# Patient Record
Sex: FEMALE | Race: WHITE | Employment: FULL TIME | URBAN - NONMETROPOLITAN AREA
[De-identification: names, ages, dates, MRNs, and addresses within clinical notes are randomized per-mention and may not be internally consistent; named-entity substitution may affect disease eponyms.]

---

## 2019-02-02 ENCOUNTER — HOSPITAL ENCOUNTER (EMERGENCY)
Age: 19
Discharge: HOME | End: 2019-02-02
Payer: COMMERCIAL

## 2019-02-02 VITALS
WEIGHT: 200 LBS | DIASTOLIC BLOOD PRESSURE: 85 MMHG | SYSTOLIC BLOOD PRESSURE: 136 MMHG | BODY MASS INDEX: 33.32 KG/M2 | TEMPERATURE: 98.8 F | HEART RATE: 93 BPM | HEIGHT: 65 IN | RESPIRATION RATE: 16 BRPM | OXYGEN SATURATION: 98 %

## 2019-02-02 DIAGNOSIS — J01.90 ACUTE SINUSITIS, RECURRENCE NOT SPECIFIED, UNSPECIFIED LOCATION: Primary | ICD-10-CM

## 2019-02-02 PROCEDURE — 99203 OFFICE O/P NEW LOW 30 MIN: CPT | Performed by: NURSE PRACTITIONER

## 2019-02-02 PROCEDURE — 99202 OFFICE O/P NEW SF 15 MIN: CPT

## 2019-02-02 RX ORDER — AMOXICILLIN AND CLAVULANATE POTASSIUM 875; 125 MG/1; MG/1
1 TABLET, FILM COATED ORAL 2 TIMES DAILY WITH MEALS
Qty: 20 TABLET | Refills: 0 | Status: SHIPPED | OUTPATIENT
Start: 2019-02-02 | End: 2019-02-12

## 2019-02-02 ASSESSMENT — ENCOUNTER SYMPTOMS
ABDOMINAL PAIN: 0
BACK PAIN: 0
SINUS PRESSURE: 1
DIARRHEA: 0
CONSTIPATION: 0
SHORTNESS OF BREATH: 0
SORE THROAT: 1
VOMITING: 0
PHOTOPHOBIA: 0
NAUSEA: 0
APNEA: 0
COUGH: 0
RHINORRHEA: 0
SINUS PAIN: 1

## 2019-02-02 ASSESSMENT — PAIN - FUNCTIONAL ASSESSMENT: PAIN_FUNCTIONAL_ASSESSMENT: ACTIVITIES ARE NOT PREVENTED

## 2019-02-02 ASSESSMENT — PAIN DESCRIPTION - ONSET: ONSET: AWAKENED FROM SLEEP

## 2019-02-02 ASSESSMENT — PAIN DESCRIPTION - PROGRESSION: CLINICAL_PROGRESSION: NOT CHANGED

## 2019-02-02 ASSESSMENT — PAIN SCALES - GENERAL: PAINLEVEL_OUTOF10: 8

## 2019-02-02 ASSESSMENT — PAIN DESCRIPTION - DESCRIPTORS: DESCRIPTORS: ACHING

## 2019-02-02 ASSESSMENT — PAIN DESCRIPTION - FREQUENCY: FREQUENCY: CONTINUOUS

## 2019-02-02 ASSESSMENT — PAIN DESCRIPTION - LOCATION: LOCATION: CHEST;HEAD

## 2019-02-02 ASSESSMENT — PAIN DESCRIPTION - PAIN TYPE: TYPE: ACUTE PAIN

## 2019-10-24 ENCOUNTER — OFFICE VISIT (OUTPATIENT)
Dept: FAMILY MEDICINE CLINIC | Age: 19
End: 2019-10-24
Payer: COMMERCIAL

## 2019-10-24 VITALS
DIASTOLIC BLOOD PRESSURE: 88 MMHG | RESPIRATION RATE: 18 BRPM | WEIGHT: 247 LBS | BODY MASS INDEX: 41.15 KG/M2 | HEIGHT: 65 IN | HEART RATE: 88 BPM | TEMPERATURE: 98.4 F | SYSTOLIC BLOOD PRESSURE: 120 MMHG | OXYGEN SATURATION: 98 %

## 2019-10-24 DIAGNOSIS — H02.843 SWELLING OF RIGHT EYELID: Primary | ICD-10-CM

## 2019-10-24 DIAGNOSIS — H02.846 SWELLING OF EYELID, LEFT: ICD-10-CM

## 2019-10-24 DIAGNOSIS — T78.40XA ALLERGIC REACTION, INITIAL ENCOUNTER: ICD-10-CM

## 2019-10-24 PROCEDURE — 96372 THER/PROPH/DIAG INJ SC/IM: CPT | Performed by: NURSE PRACTITIONER

## 2019-10-24 PROCEDURE — 99203 OFFICE O/P NEW LOW 30 MIN: CPT | Performed by: NURSE PRACTITIONER

## 2019-10-24 RX ORDER — METHYLPREDNISOLONE ACETATE 80 MG/ML
80 INJECTION, SUSPENSION INTRA-ARTICULAR; INTRALESIONAL; INTRAMUSCULAR; SOFT TISSUE ONCE
Status: COMPLETED | OUTPATIENT
Start: 2019-10-24 | End: 2019-10-24

## 2019-10-24 RX ADMIN — METHYLPREDNISOLONE ACETATE 80 MG: 80 INJECTION, SUSPENSION INTRA-ARTICULAR; INTRALESIONAL; INTRAMUSCULAR; SOFT TISSUE at 15:23

## 2019-10-24 ASSESSMENT — ENCOUNTER SYMPTOMS
EYE PAIN: 1
RHINORRHEA: 0
COUGH: 0
VOMITING: 0
SHORTNESS OF BREATH: 0
ABDOMINAL PAIN: 0
EYE REDNESS: 1
CHEST TIGHTNESS: 0
CONSTIPATION: 0
DIARRHEA: 0
EYE DISCHARGE: 0
EYE ITCHING: 1

## 2019-10-24 ASSESSMENT — PATIENT HEALTH QUESTIONNAIRE - PHQ9: DEPRESSION UNABLE TO ASSESS: FUNCTIONAL CAPACITY MOTIVATION LIMITS ACCURACY

## 2019-12-14 ENCOUNTER — HOSPITAL ENCOUNTER (EMERGENCY)
Age: 19
Discharge: HOME | End: 2019-12-14
Payer: COMMERCIAL

## 2019-12-14 VITALS
TEMPERATURE: 97.8 F | SYSTOLIC BLOOD PRESSURE: 133 MMHG | BODY MASS INDEX: 41.6 KG/M2 | RESPIRATION RATE: 18 BRPM | WEIGHT: 250 LBS | HEART RATE: 80 BPM | OXYGEN SATURATION: 98 % | DIASTOLIC BLOOD PRESSURE: 81 MMHG

## 2019-12-14 DIAGNOSIS — J02.9 ACUTE PHARYNGITIS, UNSPECIFIED ETIOLOGY: Primary | ICD-10-CM

## 2019-12-14 LAB
GROUP A STREP CULTURE, REFLEX: NEGATIVE
REFLEX THROAT C + S: NORMAL

## 2019-12-14 PROCEDURE — 99214 OFFICE O/P EST MOD 30 MIN: CPT

## 2019-12-14 PROCEDURE — 87880 STREP A ASSAY W/OPTIC: CPT

## 2019-12-14 PROCEDURE — 87070 CULTURE OTHR SPECIMN AEROBIC: CPT

## 2019-12-14 PROCEDURE — 99213 OFFICE O/P EST LOW 20 MIN: CPT | Performed by: NURSE PRACTITIONER

## 2019-12-14 RX ORDER — BROMPHENIRAMINE MALEATE, PSEUDOEPHEDRINE HYDROCHLORIDE, AND DEXTROMETHORPHAN HYDROBROMIDE 2; 30; 10 MG/5ML; MG/5ML; MG/5ML
5 SYRUP ORAL 3 TIMES DAILY PRN
Qty: 40 ML | Refills: 0 | Status: SHIPPED | OUTPATIENT
Start: 2019-12-14 | End: 2020-07-31

## 2019-12-14 RX ORDER — ACETAMINOPHEN 500 MG
500 TABLET ORAL EVERY 6 HOURS PRN
COMMUNITY

## 2019-12-14 RX ORDER — IBUPROFEN 400 MG/1
400 TABLET ORAL EVERY 6 HOURS PRN
Qty: 30 TABLET | Refills: 0 | Status: SHIPPED | OUTPATIENT
Start: 2019-12-14 | End: 2022-06-13 | Stop reason: SDUPTHER

## 2019-12-14 RX ORDER — AZELASTINE 1 MG/ML
1 SPRAY, METERED NASAL 2 TIMES DAILY
Qty: 1 BOTTLE | Refills: 3 | Status: SHIPPED | OUTPATIENT
Start: 2019-12-14 | End: 2020-07-31

## 2019-12-14 ASSESSMENT — ENCOUNTER SYMPTOMS
VOMITING: 0
ABDOMINAL PAIN: 0
RHINORRHEA: 1
CHEST TIGHTNESS: 0
SINUS PAIN: 0
SHORTNESS OF BREATH: 0
SWOLLEN GLANDS: 0
APNEA: 0
BLOOD IN STOOL: 0
STRIDOR: 0
NAUSEA: 1
COUGH: 1
WHEEZING: 0
DIARRHEA: 0
CONSTIPATION: 0
ABDOMINAL DISTENTION: 0
CHOKING: 0
ANAL BLEEDING: 0
SORE THROAT: 1

## 2019-12-14 ASSESSMENT — PAIN DESCRIPTION - ONSET: ONSET: GRADUAL

## 2019-12-14 ASSESSMENT — PAIN DESCRIPTION - PAIN TYPE: TYPE: ACUTE PAIN

## 2019-12-14 ASSESSMENT — PAIN DESCRIPTION - PROGRESSION: CLINICAL_PROGRESSION: GRADUALLY WORSENING

## 2019-12-14 ASSESSMENT — PAIN DESCRIPTION - DESCRIPTORS: DESCRIPTORS: ACHING

## 2019-12-14 ASSESSMENT — PAIN - FUNCTIONAL ASSESSMENT: PAIN_FUNCTIONAL_ASSESSMENT: ACTIVITIES ARE NOT PREVENTED

## 2019-12-14 ASSESSMENT — PAIN DESCRIPTION - FREQUENCY: FREQUENCY: CONTINUOUS

## 2019-12-14 ASSESSMENT — PAIN DESCRIPTION - LOCATION: LOCATION: THROAT

## 2019-12-16 LAB — THROAT/NOSE CULTURE: NORMAL

## 2020-02-11 ENCOUNTER — HOSPITAL ENCOUNTER (OUTPATIENT)
Age: 20
Discharge: HOME | End: 2020-02-11

## 2020-02-13 LAB — RUBELLA: 59.7 IU/ML

## 2020-02-14 LAB
MUMPS IGG TITER: 1.76
VZV IGG SER QL IA: 1.73

## 2020-02-15 LAB — RUBEOLA IGG: 20.5 AU/ML

## 2020-05-12 ENCOUNTER — HOSPITAL ENCOUNTER (EMERGENCY)
Age: 20
Discharge: HOME | End: 2020-05-12
Payer: COMMERCIAL

## 2020-05-12 VITALS
SYSTOLIC BLOOD PRESSURE: 133 MMHG | HEART RATE: 72 BPM | RESPIRATION RATE: 18 BRPM | OXYGEN SATURATION: 100 % | TEMPERATURE: 98.1 F | DIASTOLIC BLOOD PRESSURE: 92 MMHG

## 2020-05-12 LAB
HBV SURFACE AB TITR SER: NEGATIVE {TITER}
HEPATITIS C ANTIBODY: NEGATIVE

## 2020-05-12 PROCEDURE — 99282 EMERGENCY DEPT VISIT SF MDM: CPT

## 2020-05-12 PROCEDURE — 36415 COLL VENOUS BLD VENIPUNCTURE: CPT

## 2020-05-12 PROCEDURE — 86706 HEP B SURFACE ANTIBODY: CPT

## 2020-05-12 PROCEDURE — 87389 HIV-1 AG W/HIV-1&-2 AB AG IA: CPT

## 2020-05-12 PROCEDURE — 86803 HEPATITIS C AB TEST: CPT

## 2020-05-12 ASSESSMENT — ENCOUNTER SYMPTOMS
ABDOMINAL PAIN: 0
DIARRHEA: 0
SORE THROAT: 0
NAUSEA: 0
COUGH: 0
RHINORRHEA: 0
VOMITING: 0
CONSTIPATION: 0
BLOOD IN STOOL: 0
BACK PAIN: 0
SHORTNESS OF BREATH: 0

## 2020-05-13 LAB — HIV-2 AB: NEGATIVE

## 2020-07-31 ENCOUNTER — HOSPITAL ENCOUNTER (EMERGENCY)
Age: 20
Discharge: HOME | End: 2020-07-31
Payer: COMMERCIAL

## 2020-07-31 VITALS
RESPIRATION RATE: 16 BRPM | OXYGEN SATURATION: 98 % | TEMPERATURE: 98.5 F | SYSTOLIC BLOOD PRESSURE: 119 MMHG | DIASTOLIC BLOOD PRESSURE: 78 MMHG | HEART RATE: 110 BPM

## 2020-07-31 PROCEDURE — U0003 INFECTIOUS AGENT DETECTION BY NUCLEIC ACID (DNA OR RNA); SEVERE ACUTE RESPIRATORY SYNDROME CORONAVIRUS 2 (SARS-COV-2) (CORONAVIRUS DISEASE [COVID-19]), AMPLIFIED PROBE TECHNIQUE, MAKING USE OF HIGH THROUGHPUT TECHNOLOGIES AS DESCRIBED BY CMS-2020-01-R: HCPCS

## 2020-07-31 PROCEDURE — 99214 OFFICE O/P EST MOD 30 MIN: CPT

## 2020-07-31 PROCEDURE — 99213 OFFICE O/P EST LOW 20 MIN: CPT | Performed by: NURSE PRACTITIONER

## 2020-07-31 NOTE — ED PROVIDER NOTES
(TYLENOL) 500 MG tablet Take 500 mg by mouth every 6 hours as needed for PainHistorical Med      ibuprofen (IBU) 400 MG tablet Take 1 tablet by mouth every 6 hours as needed for Pain, Disp-30 tablet, R-0Print             ALLERGIES     Patient is has No Known Allergies. Patients   There is no immunization history on file for this patient. FAMILY HISTORY     Patient's family history includes Alzheimer's Disease in her maternal grandmother; Arthritis in her mother; Cancer in her maternal uncle and paternal grandmother; Heart Disease in her maternal grandmother; Other in her mother. SOCIAL HISTORY     Patient  reports that she has never smoked. She has never used smokeless tobacco. She reports that she does not drink alcohol or use drugs. PHYSICAL EXAM     ED TRIAGE VITALS  BP: 119/78, Temp: 98.5 °F (36.9 °C), Pulse: 110, Resp: 16, SpO2: 98 %,Estimated body mass index is 41.6 kg/m² as calculated from the following:    Height as of 10/24/19: 5' 5\" (1.651 m). Weight as of 12/14/19: 250 lb (113.4 kg). ,No LMP recorded. Physical Exam  Constitutional:       General: She is not in acute distress. Appearance: Normal appearance. She is not ill-appearing, toxic-appearing or diaphoretic. HENT:      Nose: Nose normal.   Cardiovascular:      Rate and Rhythm: Normal rate. Pulses: Normal pulses. Heart sounds: Normal heart sounds. No murmur. No friction rub. No gallop. Pulmonary:      Effort: Pulmonary effort is normal. No respiratory distress. Breath sounds: Normal breath sounds. No stridor. No wheezing, rhonchi or rales. Chest:      Chest wall: No tenderness. Musculoskeletal: Normal range of motion. Skin:     General: Skin is warm. Capillary Refill: Capillary refill takes less than 2 seconds. Findings: No rash. Neurological:      General: No focal deficit present. Mental Status: She is alert and oriented to person, place, and time. Sensory: No sensory deficit. Psychiatric:         Mood and Affect: Mood normal.         Behavior: Behavior normal.         Thought Content: Thought content normal.         Judgment: Judgment normal.         DIAGNOSTIC RESULTS     Labs:No results found for this visit on 07/31/20. IMAGING:    No orders to display     URGENT CARE COURSE:     Vitals:    07/31/20 1952   BP: 119/78   Pulse: 110   Resp: 16   Temp: 98.5 °F (36.9 °C)   TempSrc: Temporal   SpO2: 98%       Medications - No data to display         PROCEDURES:  None    FINAL IMPRESSION      1. Viral illness    2. Cough    3. Body aches          DISPOSITION/ PLAN   Patient is discharged home with instructions to self quarantine while COVID-19 results are pending. She is informed that this can take 4 to 5 days to be resulted. She should manage her symptoms at home such as adequate fluid hydration, use of Tylenol, and rest.  Patient is informed that if she cannot manage her symptoms at home she should go directly to the ER, such as in the event of shortness of breath, extreme body aches, blood in stool, or fevers that cannot be controlled with Tylenol. Patient is informed that if she does have a positive COVID test, she will need to follow-up with her primary care provider and/or health department for further evaluation. PATIENT REFERRED TO:  No primary care provider on file. No primary physician on file.       DISCHARGE MEDICATIONS:  Discharge Medication List as of 7/31/2020  7:54 PM          Discharge Medication List as of 7/31/2020  7:54 PM      STOP taking these medications       azelastine (ASTELIN) 0.1 % nasal spray Comments:   Reason for Stopping:         brompheniramine-pseudoephedrine-DM (BROMFED DM) 2-30-10 MG/5ML syrup Comments:   Reason for Stopping:         Magic Mouthwash (MIRACLE MOUTHWASH) Comments:   Reason for Stopping:               Discharge Medication List as of 7/31/2020  7:54 PM          DALTON Lieberman NP    (Please note that portions of this note were completed with a voice recognition program. Efforts were made to edit the dictations but occasionally words are mis-transcribed.)         DALTON Raphael NP  08/01/20 2183

## 2020-07-31 NOTE — ED TRIAGE NOTES
Ruddy Corbett arrives by self  to room with complaint of loose stool body aches fever fatigue symptoms started today.   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

## 2020-08-01 ENCOUNTER — CARE COORDINATION (OUTPATIENT)
Dept: CARE COORDINATION | Age: 20
End: 2020-08-01

## 2020-08-01 ASSESSMENT — ENCOUNTER SYMPTOMS
VOMITING: 0
STRIDOR: 0
BLOOD IN STOOL: 0
WHEEZING: 0
CHEST TIGHTNESS: 0
ABDOMINAL PAIN: 0
COUGH: 0
SHORTNESS OF BREATH: 0
NAUSEA: 0
SORE THROAT: 0
DIARRHEA: 1

## 2020-08-01 NOTE — CARE COORDINATION
Patient contacted regarding COVID-19 exposure. Discussed COVID-19 related testing which was pending at this time. Test results were pending. Patient informed of results, if available? Results pending    Care Transition Nurse/ Ambulatory Care Manager contacted the patient by telephone to perform post discharge assessment. Verified name and  with patient as identifiers. Provided introduction to self, and explanation of the CTN/ACM role, and reason for call due to risk factors for infection and/or exposure to COVID-19. Symptoms reviewed with patient who verbalized the following symptoms: fatigue, pain or aching joints, sweating, nausea, diarrhea, no new symptoms and no worsening symptoms. lack of taste and smell, headache      Due to no new or worsening symptoms encounter was not routed to provider for escalation. Discussed follow-up appointments. If no appointment was previously scheduled, appointment scheduling offered: Yes  [facility] follow up appointment(s): No future appointments. Non-[facility] follow up appointment(s): pt does not have PCP. Provided her with presevice     Advance Care Planning:   Does patient have an Advance Directive:  decision maker updated. Patient has following risk factors of: no known risk factors. CTN/ACM reviewed discharge instructions, medical action plan and red flags such as increased shortness of breath, increasing fever and signs of decompensation with patient who verbalized understanding. Discussed exposure protocols and quarantine with CDC Guidelines What to do if you are sick with coronavirus disease .  Patient was given an opportunity for questions and concerns. The patient agrees to contact the [facility] exposure line [phone], local [facility] department [place] Department of Health: ([phone]) and PCP office for questions related to their healthcare. CTN/ACM provided contact information for future needs.     Reviewed and educated patient on any new and changed medications related to discharge diagnosis     Patient/family/caregiver given information for GetWell Loop and agrees to enroll yes  Patient's preferred e-mail: [email or web address]   Patient's preferred phone number: [phone]  Based on Loop alert triggers, patient will be contacted by nurse care manager for worsening symptoms. Pt will be further monitored by COVID Loop Team based on severity of symptoms and risk factors. Pt reports diarrhea persists. Encouraged to use own bathroom facilities and to isolate from others in the household. Encouraged increased fluids and rest.  Aware covid test results pending. ACP reviewed. Enrolled in Loop.

## 2020-08-01 NOTE — ED NOTES
Covid pharyngeal swab obtained and sent to lab. Proper ppe worn during procedure.      Doyle Au LPN  02/51/88 7732

## 2020-08-03 ENCOUNTER — CARE COORDINATION (OUTPATIENT)
Dept: CARE COORDINATION | Age: 20
End: 2020-08-03

## 2020-08-03 LAB — SARS-COV-2: NOT DETECTED

## 2020-08-03 NOTE — CARE COORDINATION
RN placed call to patient to respond to Yellow alert due to GI symptoms in GetWell Loop. Patient reports her symptoms are the same as when she was seen. Loose stools are most concerning to patient. Patient is concerned about CDIFF if this is not COVID 19.   COVID 19 results pending. Patient has mychart. RN instructed patient to continue to monitor symptoms, report them on loop. Increase fluids and rest, take tylenol for body aches or fever unless contraindicated. PCP was not notified of alert due to no worsening concerns. Patient encouraged to call PCP if symptoms worsen. Precautions reviewed for COVID-19 per CDC  Wash hands often with soap and water for at least 20 seconds  When soap is not available us hand  with at least 70% alcohol  Avoid touching your face  Avoid close contact with others  Maintain 6 feet distance if possible    While you are sick:  Cover mouth and nose when coughing or sneezing and then wash hands  Wear a mask when around others  Clean and disinfect surfaces often with soap or detergent and water.

## 2020-08-06 ENCOUNTER — HOSPITAL ENCOUNTER (EMERGENCY)
Age: 20
Discharge: HOME | End: 2020-08-06
Payer: COMMERCIAL

## 2020-08-06 VITALS
RESPIRATION RATE: 16 BRPM | TEMPERATURE: 98.6 F | BODY MASS INDEX: 38.57 KG/M2 | HEART RATE: 100 BPM | HEIGHT: 66 IN | OXYGEN SATURATION: 97 % | SYSTOLIC BLOOD PRESSURE: 116 MMHG | WEIGHT: 240 LBS | DIASTOLIC BLOOD PRESSURE: 85 MMHG

## 2020-08-06 PROCEDURE — 99213 OFFICE O/P EST LOW 20 MIN: CPT

## 2020-08-06 PROCEDURE — 99213 OFFICE O/P EST LOW 20 MIN: CPT | Performed by: NURSE PRACTITIONER

## 2020-08-06 RX ORDER — GREEN TEA/HOODIA GORDONII 315-12.5MG
1 CAPSULE ORAL DAILY
Qty: 30 TABLET | Refills: 0 | Status: SHIPPED | OUTPATIENT
Start: 2020-08-06 | End: 2020-09-05

## 2020-08-06 ASSESSMENT — ENCOUNTER SYMPTOMS
COUGH: 0
SORE THROAT: 0
VOMITING: 0
SHORTNESS OF BREATH: 0
NAUSEA: 0
DIARRHEA: 1
ABDOMINAL PAIN: 1

## 2020-08-06 ASSESSMENT — PAIN DESCRIPTION - ORIENTATION: ORIENTATION: MID

## 2020-08-06 ASSESSMENT — PAIN DESCRIPTION - LOCATION: LOCATION: ABDOMEN

## 2020-08-06 ASSESSMENT — PAIN SCALES - GENERAL: PAINLEVEL_OUTOF10: 7

## 2020-08-06 NOTE — ED PROVIDER NOTES
[place] [address]  Urgent Care Encounter       CHIEF COMPLAINT       Chief Complaint   Patient presents with    Abdominal Pain     fatigue, diarrhea, continues tested neg for covid last friday    Diarrhea     5 to 6 times daily, watery, no fever, has felt hot       Nurses Notes reviewed and I agree except as noted in the HPI. HISTORY OF PRESENT ILLNESS   Donnia Cabot is a 20 y.o. female who presents for evaluation of abdominal cramping and persistent diarrhea that is been ongoing for the past 6 days without relief. Patient denies any fever, cough, congestion or sore throat. Patient denies any unusual foods or any new medications that she can recall before the symptoms began. Patient denies having taken any medications at home for the symptoms. The history is provided by the patient. REVIEW OF SYSTEMS     Review of Systems   Constitutional: Negative for chills and fever. HENT: Negative for congestion and sore throat. Respiratory: Negative for cough and shortness of breath. Cardiovascular: Negative for chest pain. Gastrointestinal: Positive for abdominal pain (cramping) and diarrhea. Negative for nausea and vomiting. Musculoskeletal: Negative for arthralgias and myalgias. Skin: Negative for rash. Allergic/Immunologic: Negative for immunocompromised state. Neurological: Negative for headaches. PAST MEDICAL HISTORY         Diagnosis Date    RSV (acute bronchiolitis due to respiratory syncytial virus)        SURGICALHISTORY     Patient  has no past surgical history on file.     CURRENT MEDICATIONS       Discharge Medication List as of 8/6/2020  4:22 PM      CONTINUE these medications which have NOT CHANGED    Details   acetaminophen (TYLENOL) 500 MG tablet Take 500 mg by mouth every 6 hours as needed for PainHistorical Med      ibuprofen (IBU) 400 MG tablet Take 1 tablet by mouth every 6 hours as needed for Pain, Disp-30 tablet, R-0Print Behavior: Behavior normal.         DIAGNOSTIC RESULTS     Labs:No results found for this visit on 08/06/20. IMAGING:    No orders to display         EKG:  none    URGENT CARE COURSE:     Vitals:    08/06/20 1600   BP: 116/85   Pulse: 100   Resp: 16   Temp: 98.6 °F (37 °C)   TempSrc: Infrared   SpO2: 97%   Weight: 240 lb (108.9 kg)   Height: 5' 5.5\" (1.664 m)       Medications - No data to display         PROCEDURES:  None    FINAL IMPRESSION      1. Diarrhea of presumed infectious origin          DISPOSITION/ PLAN     I discussed with the patient the plan to provide a gastrointestinal panel to evaluate the cause of her diarrhea. She is also given a prescription for probiotics and advised to remain hydrated at home. She is given information of a primary care provider that she may follow-up with if her symptoms do not resolve or if there is no findings from the gastrointestinal panel. She is agreeable to plan as discussed and will follow-up on an outpatient basis as needed. PATIENT REFERRED TO:  No primary care provider on file. No primary physician on file.       DISCHARGE MEDICATIONS:  Discharge Medication List as of 8/6/2020  4:22 PM      START taking these medications    Details   Probiotic Acidophilus (FLORANEX) TABS Take 1 tablet by mouth daily, Disp-30 tablet,R-0Normal             Discharge Medication List as of 8/6/2020  4:22 PM          Discharge Medication List as of 8/6/2020  4:22 PM          DALTON Us CNP    (Please note that portions of this note were completed with a voice recognition program. Efforts were made to edit the dictations but occasionally words are mis-transcribed.)          DALTON Us CNP  08/06/20 3562

## 2020-08-06 NOTE — ED NOTES
Pt. Released in stable condition, ambulated per self to private car. Instructed pt to follow-up with family doctor as needed for recheck or go directly to the emergency department for any concerns/worsening conditions. Pt. Verbalized understanding of instructions. No questions at this time. RX in hand.       Timo Wright RN  08/06/20 7279

## 2020-08-08 ENCOUNTER — HOSPITAL ENCOUNTER (OUTPATIENT)
Age: 20
Discharge: HOME | End: 2020-08-08
Payer: COMMERCIAL

## 2020-08-08 LAB
ADENOVIRUS F 40 41 PCR: NOT DETECTED
ASTROVIRUS PCR: NOT DETECTED
CAMPYLOBACTER PCR: NOT DETECTED
CLOSTRIDIUM DIFFICILE, PCR: NOT DETECTED
CRYPTOSPORIDIUM PCR: NOT DETECTED
CYCLOSPORA CAYETANENSIS PCR: DETECTED
E COLI 0157 PCR: ABNORMAL
E COLI ENTEROAGGREGATIVE PCR: NOT DETECTED
E COLI ENTEROPATHOGENIC PCR: NOT DETECTED
E COLI ENTEROTOXIGENIC PCR: NOT DETECTED
E COLI SHIGA LIKE TOXIN PCR: NOT DETECTED
E COLI SHIGELLA/ENTEROINVASIVE PCR: NOT DETECTED
E HISTOLYTICA GI FILM ARRAY: NOT DETECTED
GIARDIA LAMBLIA PCR: NOT DETECTED
NOROVIRUS GI GII PCR: NOT DETECTED
PLESIOMONAS SHIGELLOIDES PCR: NOT DETECTED
ROTAVIRUS A PCR: NOT DETECTED
SALMONELLA PCR: NOT DETECTED
SAPOVIRUS PCR: NOT DETECTED
VIBRIO CHOLERAE PCR: NOT DETECTED
VIBRIO PCR: NOT DETECTED
YERSINIA ENTEROCOLITICA PCR: NOT DETECTED

## 2020-08-08 PROCEDURE — 0097U HC GI PTHGN MULT REV TRANS & AMP PRB TECH 22 TRGT: CPT

## 2022-04-25 ENCOUNTER — TELEPHONE (OUTPATIENT)
Dept: FAMILY MEDICINE CLINIC | Age: 22
End: 2022-04-25

## 2022-04-25 NOTE — TELEPHONE ENCOUNTER
----- Message from Red Madden sent at 2022  1:53 PM EDT -----  Subject: Appointment Request    Reason for Call: New Patient Request Appointment    QUESTIONS  Type of Appointment? New Patient/New to Provider  Reason for appointment request? No appointments available during search  Additional Information for Provider? Pt would like to be scheduled with Dr Cordelia Zuleta for a new pt. She also would like the tb shot and covid if you   guys give those shots.  ---------------------------------------------------------------------------  --------------  CALL BACK INFO  What is the best way for the office to contact you? OK to leave message on   voicemail  Preferred Call Back Phone Number? [phone]  ---------------------------------------------------------------------------  --------------  SCRIPT ANSWERS  Relationship to Patient? Self  Specialty Confirmation? Primary Care  Is this the first appointment to establish care for a ? No  Have you been diagnosed with, awaiting test results for, or told that you   are suspected of having COVID-19 (Coronavirus)? (If patient has tested   negative or was tested as a requirement for work, school, or travel and   not based on symptoms, answer no)? No  Within the past 10 days have you developed any of the following symptoms   (answer no if symptoms have been present longer than 10 days or began   more than 10 days ago)? Fever or Chills, Cough, Shortness of breath or   difficulty breathing, Loss of taste or smell, Sore throat, Nasal   congestion, Sneezing or runny nose, Fatigue or generalized body aches   (answer no if pain is specific to a body part e.g. back pain), Diarrhea,   Headache? No  Have you had close contact with someone with COVID-19 in the last 7 days? No  (Service Expert  click yes below to proceed with [employer] As Usual   Scheduling)?  Yes

## 2022-04-28 ENCOUNTER — OFFICE VISIT (OUTPATIENT)
Dept: FAMILY MEDICINE CLINIC | Age: 22
End: 2022-04-28
Payer: COMMERCIAL

## 2022-04-28 VITALS
HEART RATE: 85 BPM | OXYGEN SATURATION: 97 % | DIASTOLIC BLOOD PRESSURE: 86 MMHG | RESPIRATION RATE: 14 BRPM | HEIGHT: 66 IN | SYSTOLIC BLOOD PRESSURE: 134 MMHG | WEIGHT: 274 LBS | TEMPERATURE: 98.5 F | BODY MASS INDEX: 44.03 KG/M2

## 2022-04-28 DIAGNOSIS — Z01.84 IMMUNITY STATUS TESTING: ICD-10-CM

## 2022-04-28 DIAGNOSIS — K06.8 BLEEDING GUMS: ICD-10-CM

## 2022-04-28 DIAGNOSIS — L91.8 SKIN TAG: ICD-10-CM

## 2022-04-28 DIAGNOSIS — Z00.00 ENCOUNTER FOR MEDICAL EXAMINATION TO ESTABLISH CARE: Primary | ICD-10-CM

## 2022-04-28 DIAGNOSIS — Z13.31 DEPRESSION SCREENING NEGATIVE: ICD-10-CM

## 2022-04-28 DIAGNOSIS — Z83.2 FAMILY HISTORY OF FACTOR V LEIDEN MUTATION: ICD-10-CM

## 2022-04-28 PROCEDURE — 90715 TDAP VACCINE 7 YRS/> IM: CPT | Performed by: NURSE PRACTITIONER

## 2022-04-28 PROCEDURE — 99204 OFFICE O/P NEW MOD 45 MIN: CPT | Performed by: NURSE PRACTITIONER

## 2022-04-28 PROCEDURE — 90471 IMMUNIZATION ADMIN: CPT | Performed by: NURSE PRACTITIONER

## 2022-04-28 PROCEDURE — 86580 TB INTRADERMAL TEST: CPT | Performed by: NURSE PRACTITIONER

## 2022-04-28 ASSESSMENT — PATIENT HEALTH QUESTIONNAIRE - PHQ9
SUM OF ALL RESPONSES TO PHQ QUESTIONS 1-9: 1
SUM OF ALL RESPONSES TO PHQ QUESTIONS 1-9: 1
2. FEELING DOWN, DEPRESSED OR HOPELESS: 0
SUM OF ALL RESPONSES TO PHQ9 QUESTIONS 1 & 2: 1
SUM OF ALL RESPONSES TO PHQ QUESTIONS 1-9: 1
1. LITTLE INTEREST OR PLEASURE IN DOING THINGS: 1
SUM OF ALL RESPONSES TO PHQ QUESTIONS 1-9: 1

## 2022-04-28 ASSESSMENT — ENCOUNTER SYMPTOMS
RESPIRATORY NEGATIVE: 1
BACK PAIN: 0
GASTROINTESTINAL NEGATIVE: 1

## 2022-04-28 NOTE — PROGRESS NOTES
Immunization(s) given during visit:    Immunizations Administered     Name Date Dose Route    PPD Test 4/28/2022 0.1 mL Intradermal    Site: Left Forearm    Lot: [id number]    NDC: 66151-349-31    Tdap (Boostrix, Adacel) 4/28/2022 0.5 mL Intramuscular    Site: Deltoid- Left    Lot: [id number]    NDC: 79391-810-46

## 2022-04-28 NOTE — PROGRESS NOTES
[address] [place] [address]  [address] [place] [address]  Dept: [phone]  Dept Fax: [phone]  Loc: [address] Service Paul CASILLAS is a 24 y.o. femalewho presents today for her medical conditions/complaints as noted below. Reza Stoner c/o of New Patient (skin tags on neck) and Other (discuss covid vaccine, also needs a TB test)      HPI:      Pt here to establish care. Pt denies any concerns or health issues. Her mother and grandmother do have Factor V Leiden disease and she would rachelle tested for coagulopathies. She has never had a blood clot before. She does have some bleeding gums at times. Would like  To loose weight,     Has several skin tags on her neck she would like removed. Current Outpatient Medications   Medication Sig Dispense Refill    acetaminophen (TYLENOL) 500 MG tablet Take 500 mg by mouth every 6 hours as needed for Pain      ibuprofen (IBU) 400 MG tablet Take 1 tablet by mouth every 6 hours as needed for Pain 30 tablet 0     No current facility-administered medications for this visit. Past Medical History:   Diagnosis Date    RSV (acute bronchiolitis due to respiratory syncytial virus)       History reviewed. No pertinent surgical history.   Family History   Problem Relation Age of Onset    Arthritis Mother     Other Mother         factor V    High Blood Pressure Father     Alcohol Abuse Father     Cancer Maternal Uncle     Alzheimer's Disease Maternal Grandmother     COPD Maternal Grandmother     Diabetes Maternal Grandmother     Cancer Paternal Grandmother     Arthritis Maternal Grandfather     Alcohol Abuse Paternal Grandfather      Social History     Tobacco Use    Smoking status: Never Smoker    Smokeless tobacco: Never Used   Substance Use Topics    Alcohol use: No        No Known Allergies    Health Maintenance   Topic Date Due    Varicella vaccine (1 of 2 - 2-dose childhood series) Never done    COVID-19 Vaccine (1) Never done    Depression Screen  Never done    Chlamydia screen  Never done    Pap smear  Never done    Flu vaccine (Season Ended) 09/01/2022    DTaP/Tdap/Td vaccine (8 - Td or Tdap) 04/28/2032    Hepatitis B vaccine  Completed    Hib vaccine  Completed    HPV vaccine  Completed    Hepatitis C screen  Completed    HIV screen  Completed    Hepatitis A vaccine  Aged Out    Meningococcal (ACWY) vaccine  Aged Out    Pneumococcal 0-64 years Vaccine  Aged Out       Subjective:      Review of Systems   Constitutional: Negative for chills, fatigue and fever. HENT: Negative. Respiratory: Negative. Cardiovascular: Negative. Gastrointestinal: Negative. Genitourinary: Negative for difficulty urinating, dysuria and menstrual problem. Musculoskeletal: Negative for arthralgias, back pain and myalgias. Skin: Negative. Neurological: Negative for dizziness, facial asymmetry, weakness and headaches. Psychiatric/Behavioral: Negative for self-injury, sleep disturbance and suicidal ideas. The patient is not nervous/anxious. Objective:      /86   Pulse 85   Temp 98.5 °F (36.9 °C) (Oral)   Resp 14   Ht 5' 5.5\" (1.664 m)   Wt 274 lb (124.3 kg)   SpO2 97%   BMI 44.90 kg/m²      Physical Exam  Vitals and nursing note reviewed. Constitutional:       Appearance: She is obese. She is not ill-appearing. HENT:      Right Ear: Tympanic membrane, ear canal and external ear normal.      Left Ear: Tympanic membrane, ear canal and external ear normal.      Nose: Nose normal.      Mouth/Throat:      Mouth: Mucous membranes are moist.      Dentition: Abnormal dentition (teeth with plaque accumulation, no gingival disease). No gingival swelling, dental caries, dental abscesses or gum lesions. Pharynx: No pharyngeal swelling, oropharyngeal exudate or posterior oropharyngeal erythema. Tonsils: No tonsillar exudate or tonsillar abscesses.    Eyes: Pupils: Pupils are equal, round, and reactive to light. Cardiovascular:      Rate and Rhythm: Normal rate and regular rhythm. Pulses: Normal pulses. Heart sounds: Normal heart sounds. No murmur heard. Pulmonary:      Effort: Pulmonary effort is normal. No respiratory distress. Breath sounds: Normal breath sounds. Abdominal:      General: Abdomen is flat. Bowel sounds are normal. There is no distension. Palpations: Abdomen is soft. Musculoskeletal:         General: Normal range of motion. Skin:     General: Skin is warm and dry. Capillary Refill: Capillary refill takes less than 2 seconds. Comments: 2 skin tags noted on bilateral sides of neck, they are not inflamed or reddened. Neurological:      General: No focal deficit present. Mental Status: She is alert and oriented to person, place, and time. Psychiatric:         Mood and Affect: Mood normal.         Behavior: Behavior normal.         Thought Content: Thought content normal.         Judgment: Judgment normal.          Assessment/Plan:           1. Encounter for medical examination to establish care    - CBC with Auto Differential; Future  - Basic Metabolic Panel; Future    2. Depression screening negative      3. Family history of factor V Leiden mutation  R/o coagulopathy disorder  - Factor 5 Activity; Future  - Protein C Antigen, Total; Future  - Protein S Activity; Future  - Prothrombin Gene Mutation; Future    4. Bleeding gums    dental visit for cleaning   5. BMI 40.0-44.9, adult [facility])  Referred to dietician   Increase physical activity   Decrease calories   - Basic Metabolic Panel; Future  - TSH With Reflex Ft4; Future  - Lipid Panel; Future  [facility]    6. Immunity status testing  Had covid last fall   - Covid-19, Antibody, Total; Future    7.  Skin tag  Will remove at next visit       Return in about 2 days (around 4/30/2022) for 2-3 days UC for TB RICARDA [facility]tree on  May 9 for skin tag and 2nd TB, and [place] May 11th TB read . Reccommended tobaccocessation options including pharmacologic methods, counseled great than 3 minutesduring this visit:  Yes[]  No  []       Patient given educational materials -see patient instructions. Discussed use, benefit, and side effects of prescribedmedications. All patient questions answered. Pt voiced understanding. Reviewedhealth maintenance. Instructed to continue current medications, diet and exercise. Patient agreed with treatment plan. Follow up as directed.        Electronicallysigned by DALTON Cole CNP on 4/28/2022 at 4:47 PM

## 2022-04-29 ENCOUNTER — TELEPHONE (OUTPATIENT)
Dept: FAMILY MEDICINE CLINIC | Age: 22
End: 2022-04-29

## 2022-04-29 NOTE — TELEPHONE ENCOUNTER
----- Message from DALTON Schwab CNP sent at 4/29/2022 10:09 AM EDT -----  Let pt know her tsh is normal, her sodium is just 1 point too low at 134 and should be 135-145, I recommend she eat salty foods to bring this up.  Her cholesterol numbers are normal. Renal function normal. We willl be in touch with her when her other labs are back

## 2022-05-02 ENCOUNTER — TELEPHONE (OUTPATIENT)
Dept: FAMILY MEDICINE CLINIC | Age: 22
End: 2022-05-02

## 2022-05-02 ENCOUNTER — TELEPHONE (OUTPATIENT)
Dept: INTERNAL MEDICINE CLINIC | Age: 22
End: 2022-05-02

## 2022-05-02 NOTE — TELEPHONE ENCOUNTER
----- Message from Lady Galloway, DO sent at 5/2/2022  7:04 AM EDT -----  Please let pt know that 2 more labs are back, looking at her clotting system. Those 2 are WNL  1 final lab is pending, will call once that is back  Let me know if questions, thanks!

## 2022-05-02 NOTE — TELEPHONE ENCOUNTER
----- Message from Darwin Blairtingham, DALTON - CNP sent at 4/30/2022  9:09 AM EDT -----  Let pt know she is positive for antibodies to covid .  This means there is high probability she has had covid in the past

## 2022-05-02 NOTE — TELEPHONE ENCOUNTER
Symptomatically it would present as a blood clot  However, if she has it, it should show up in the lab work. That result is the one we are waiting for yet  Will call once available  Let me know if questions, thanks!

## 2022-05-02 NOTE — TELEPHONE ENCOUNTER
Outpatient dietitian CB to patient wanting to schedule RD appt for obesity. Explained to patient to check with her insurance if dietitian services are covered and if not will need a plan of care visit with [facility] provider prior to dietitian appt. Then Charged $25.00/visit with the dietitian. Pt understood and wanting to pursue plan of care visit and then RD appt. Explained will have office staff call her back for scheduling.

## 2022-05-02 NOTE — TELEPHONE ENCOUNTER
Called pt and advised of results. She wants to know how Factor V presents itself? She has a Family hx of this. She states that they weren't aware until Traumatic incidents happened. She asked if this is something that lays dormant until something happens.

## 2022-05-04 ENCOUNTER — TELEPHONE (OUTPATIENT)
Dept: FAMILY MEDICINE CLINIC | Age: 22
End: 2022-05-04

## 2022-05-04 ENCOUNTER — PATIENT MESSAGE (OUTPATIENT)
Dept: FAMILY MEDICINE CLINIC | Age: 22
End: 2022-05-04

## 2022-05-04 NOTE — TELEPHONE ENCOUNTER
----- Message from Elver Hull, DO sent at 5/3/2022  9:44 PM EDT -----  Please let pt know that Factor V is WNL  1 lab still pending, will call once available. Let me know if questions, thanks!

## 2022-05-04 NOTE — TELEPHONE ENCOUNTER
----- Message from Alexandramegganclinton Sommer sent at 5/4/2022  3:55 PM EDT -----  Subject: Results Request    QUESTIONS  Which lab or imaging result is the patient calling about? Factor 5   Activity  Which provider ordered the test?   At what location was the test performed? Date the test was performed? Additional Information for Provider? pt needs clarification of what the   results mean. Please advise.  ---------------------------------------------------------------------------  --------------  CALL BACK INFO  What is the best way for the office to contact you? OK to leave message on   voicemail  Preferred Call Back Phone Number? [phone]  ---------------------------------------------------------------------------  --------------  SCRIPT ANSWERS  Relationship to Patient?  Self

## 2022-05-06 ENCOUNTER — TELEPHONE (OUTPATIENT)
Dept: FAMILY MEDICINE CLINIC | Age: 22
End: 2022-05-06

## 2022-05-06 NOTE — TELEPHONE ENCOUNTER
----- Message from Darwin Dyson, DALTON - CNP sent at 5/6/2022  9:14 AM EDT -----  Let pt know all coagulation studies are back  and in normal range, She does not have the factor v abnormality  plesse let me know if she has any questions.

## 2022-05-09 ENCOUNTER — NURSE ONLY (OUTPATIENT)
Dept: FAMILY MEDICINE CLINIC | Age: 22
End: 2022-05-09
Payer: COMMERCIAL

## 2022-05-09 DIAGNOSIS — Z11.1 PPD SCREENING TEST: Primary | ICD-10-CM

## 2022-05-09 PROCEDURE — 86580 TB INTRADERMAL TEST: CPT | Performed by: NURSE PRACTITIONER

## 2022-05-09 NOTE — PROGRESS NOTES
Immunization(s) given during visit:    Immunizations Administered     Name Date Dose Route    PPD Test 5/9/2022 0.1 mL Intradermal    Site: Right Forearm    Lot: [id number]    NDC: 88753-773-46

## 2022-05-11 ENCOUNTER — NURSE ONLY (OUTPATIENT)
Dept: FAMILY MEDICINE CLINIC | Age: 22
End: 2022-05-11

## 2022-05-11 DIAGNOSIS — Z11.1 ENCOUNTER FOR PPD SKIN TEST READING: Primary | ICD-10-CM

## 2022-05-11 LAB
INDURATION: 0
TB SKIN TEST: NORMAL

## 2022-05-11 NOTE — PROGRESS NOTES
PPD Reading Note  PPD read and results entered in [facility]. Result: 0 mm induration.   Interpretation: neg

## 2022-05-12 ENCOUNTER — TELEPHONE (OUTPATIENT)
Dept: INTERNAL MEDICINE CLINIC | Age: 22
End: 2022-05-12

## 2022-05-12 NOTE — TELEPHONE ENCOUNTER
Outpatient dietitian left msge with patient to callback and schedule initial RD appt for a date and time after her plan of care visit on 5/18/22. Cb# and office hours provided.

## 2022-06-13 ENCOUNTER — APPOINTMENT (OUTPATIENT)
Dept: CT IMAGING | Age: 22
End: 2022-06-13
Payer: COMMERCIAL

## 2022-06-13 ENCOUNTER — APPOINTMENT (OUTPATIENT)
Dept: GENERAL RADIOLOGY | Age: 22
End: 2022-06-13
Payer: COMMERCIAL

## 2022-06-13 ENCOUNTER — HOSPITAL ENCOUNTER (EMERGENCY)
Age: 22
Discharge: HOME | End: 2022-06-13
Attending: EMERGENCY MEDICINE
Payer: COMMERCIAL

## 2022-06-13 VITALS
HEART RATE: 88 BPM | TEMPERATURE: 98.3 F | OXYGEN SATURATION: 97 % | DIASTOLIC BLOOD PRESSURE: 95 MMHG | RESPIRATION RATE: 17 BRPM | SYSTOLIC BLOOD PRESSURE: 137 MMHG

## 2022-06-13 DIAGNOSIS — M79.10 MUSCLE PAIN: ICD-10-CM

## 2022-06-13 DIAGNOSIS — S43.112A: Primary | ICD-10-CM

## 2022-06-13 DIAGNOSIS — S40.022A CONTUSION OF LEFT UPPER EXTREMITY, INITIAL ENCOUNTER: ICD-10-CM
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
## 2022-06-13 PROCEDURE — 73030 X-RAY EXAM OF SHOULDER: CPT

## 2022-06-13 PROCEDURE — 72125 CT NECK SPINE W/O DYE: CPT

## 2022-06-13 PROCEDURE — 71045 X-RAY EXAM CHEST 1 VIEW: CPT

## 2022-06-13 PROCEDURE — 96375 TX/PRO/DX INJ NEW DRUG ADDON: CPT

## 2022-06-13 PROCEDURE — 6370000000 HC RX 637 (ALT 250 FOR IP): Performed by: NURSE PRACTITIONER

## 2022-06-13 PROCEDURE — 6360000002 HC RX W HCPCS: Performed by: NURSE PRACTITIONER

## 2022-06-13 PROCEDURE — 96374 THER/PROPH/DIAG INJ IV PUSH: CPT

## 2022-06-13 PROCEDURE — 72170 X-RAY EXAM OF PELVIS: CPT

## 2022-06-13 PROCEDURE — 73552 X-RAY EXAM OF FEMUR 2/>: CPT

## 2022-06-13 PROCEDURE — 99284 EMERGENCY DEPT VISIT MOD MDM: CPT

## 2022-06-13 RX ORDER — MORPHINE SULFATE 2 MG/ML
4 INJECTION, SOLUTION INTRAMUSCULAR; INTRAVENOUS ONCE
Status: COMPLETED | OUTPATIENT
Start: 2022-06-13 | End: 2022-06-13

## 2022-06-13 RX ORDER — LIDOCAINE 4 G/G
1 PATCH TOPICAL DAILY
Status: DISCONTINUED | OUTPATIENT
Start: 2022-06-13 | End: 2022-06-13 | Stop reason: HOSPADM

## 2022-06-13 RX ORDER — ONDANSETRON 2 MG/ML
4 INJECTION INTRAMUSCULAR; INTRAVENOUS ONCE
Status: COMPLETED | OUTPATIENT
Start: 2022-06-13 | End: 2022-06-13

## 2022-06-13 RX ORDER — IBUPROFEN 600 MG/1
600 TABLET ORAL 3 TIMES DAILY PRN
Qty: 30 TABLET | Refills: 0 | Status: SHIPPED | OUTPATIENT
Start: 2022-06-13

## 2022-06-13 RX ADMIN — MORPHINE SULFATE 4 MG: 2 INJECTION, SOLUTION INTRAMUSCULAR; INTRAVENOUS at 13:53

## 2022-06-13 RX ADMIN — ONDANSETRON 4 MG: 2 INJECTION INTRAMUSCULAR; INTRAVENOUS at 13:50

## 2022-06-13 ASSESSMENT — PAIN DESCRIPTION - LOCATION
LOCATION: ARM;HIP;LEG
LOCATION: HIP;SHOULDER;LEG

## 2022-06-13 ASSESSMENT — PAIN SCALES - GENERAL
PAINLEVEL_OUTOF10: 8
PAINLEVEL_OUTOF10: 8

## 2022-06-13 ASSESSMENT — PAIN - FUNCTIONAL ASSESSMENT: PAIN_FUNCTIONAL_ASSESSMENT: 0-10

## 2022-06-13 ASSESSMENT — PAIN DESCRIPTION - ORIENTATION
ORIENTATION: LEFT
ORIENTATION: LEFT
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
## 2022-06-13 NOTE — LETTER
[facility] [address] [address] EMERGENCY DEPT  [address] [place] [id number]  Phone: [phone]               June 13, 2022    Patient: Malik Castano   YOB: 2000   Date of Visit: 6/13/2022       To Whom It May Concern:    Kandice Butcher was seen and treated in our emergency department on 6/13/2022. She may return to work on 6/15/22.       Sincerely,       DOMINICK        Signature:__________________________________
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
## 2022-06-13 NOTE — ED NOTES
Pt medicated per MAR. Pt tolerated well. Respirations unlabored.       Carlo Hayes RN  06/13/22 0907

## 2022-06-18 ASSESSMENT — ENCOUNTER SYMPTOMS
SORE THROAT: 0
SHORTNESS OF BREATH: 0
CONSTIPATION: 0
DIARRHEA: 0
VOMITING: 0
COLOR CHANGE: 0
RHINORRHEA: 0
NAUSEA: 0
EYE PAIN: 0
COUGH: 0
ABDOMINAL DISTENTION: 0
WHEEZING: 0
EYE DISCHARGE: 0
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
## 2022-06-20 ENCOUNTER — TELEPHONE (OUTPATIENT)
Dept: INTERNAL MEDICINE CLINIC | Age: 22
End: 2022-06-20

## 2022-06-20 NOTE — TELEPHONE ENCOUNTER
Outpatient dietitian left msge with patient to let her know the cost of her visit today 6/20/22,when she comes to see the dietitian @ 3pm.  Explained we can schedule a plan of care visit with one of our MD providers here in our department 1st then RD visit would only be charged $25.00/visit vs without a plan of care appt, cost would be $57.00/15 minutes. Callback # provided.

## 2022-10-04 NOTE — TELEPHONE ENCOUNTER
Pt informed and understanding The Delivery OB Provider certifies that vaginal examination and/or abdominal examination after the delivery was done and no foreign body was found.

## 2023-08-15 ENCOUNTER — OFFICE VISIT (OUTPATIENT)
Dept: FAMILY MEDICINE CLINIC | Age: 23
End: 2023-08-15
Payer: COMMERCIAL

## 2023-08-15 VITALS
OXYGEN SATURATION: 97 % | HEART RATE: 81 BPM | DIASTOLIC BLOOD PRESSURE: 72 MMHG | BODY MASS INDEX: 45.45 KG/M2 | HEIGHT: 66 IN | WEIGHT: 282.8 LBS | SYSTOLIC BLOOD PRESSURE: 134 MMHG | RESPIRATION RATE: 16 BRPM | TEMPERATURE: 97.8 F

## 2023-08-15 DIAGNOSIS — Z00.00 PHYSICAL EXAM: Primary | ICD-10-CM

## 2023-08-15 DIAGNOSIS — R06.83 SNORES: ICD-10-CM

## 2023-08-15 DIAGNOSIS — F34.1 DYSTHYMIA: ICD-10-CM

## 2023-08-15 DIAGNOSIS — F41.9 ANXIETY: ICD-10-CM

## 2023-08-15 DIAGNOSIS — R53.83 OTHER FATIGUE: ICD-10-CM

## 2023-08-15 PROCEDURE — 99395 PREV VISIT EST AGE 18-39: CPT | Performed by: NURSE PRACTITIONER

## 2023-08-15 PROCEDURE — 99213 OFFICE O/P EST LOW 20 MIN: CPT | Performed by: NURSE PRACTITIONER

## 2023-08-15 SDOH — ECONOMIC STABILITY: FOOD INSECURITY: WITHIN THE PAST 12 MONTHS, YOU WORRIED THAT YOUR FOOD WOULD RUN OUT BEFORE YOU GOT MONEY TO BUY MORE.: NEVER TRUE

## 2023-08-15 SDOH — ECONOMIC STABILITY: HOUSING INSECURITY
IN THE LAST 12 MONTHS, WAS THERE A TIME WHEN YOU DID NOT HAVE A STEADY PLACE TO SLEEP OR SLEPT IN A SHELTER (INCLUDING NOW)?: NO

## 2023-08-15 SDOH — ECONOMIC STABILITY: INCOME INSECURITY: HOW HARD IS IT FOR YOU TO PAY FOR THE VERY BASICS LIKE FOOD, HOUSING, MEDICAL CARE, AND HEATING?: NOT HARD AT ALL

## 2023-08-15 SDOH — ECONOMIC STABILITY: FOOD INSECURITY: WITHIN THE PAST 12 MONTHS, THE FOOD YOU BOUGHT JUST DIDN'T LAST AND YOU DIDN'T HAVE MONEY TO GET MORE.: NEVER TRUE

## 2023-08-15 ASSESSMENT — PATIENT HEALTH QUESTIONNAIRE - PHQ9
SUM OF ALL RESPONSES TO PHQ QUESTIONS 1-9: 2
SUM OF ALL RESPONSES TO PHQ QUESTIONS 1-9: 2
SUM OF ALL RESPONSES TO PHQ9 QUESTIONS 1 & 2: 2
SUM OF ALL RESPONSES TO PHQ QUESTIONS 1-9: 2
2. FEELING DOWN, DEPRESSED OR HOPELESS: 1
SUM OF ALL RESPONSES TO PHQ QUESTIONS 1-9: 2
1. LITTLE INTEREST OR PLEASURE IN DOING THINGS: 1

## 2023-08-15 NOTE — PROGRESS NOTES
Chief Complaint   Patient presents with    Follow-up     Wants referral to weight management/ wants to discuss lack of sleep and extreme tiredness         SUBJECTIVE:  Afshin Wood is a 20 y.o. female for physical exam.    Diet - eats a wide variety of foods  Exercise - no formal program  Sleep - not sleeping well does do shift work, some days sleeps more than 8 hours and some nights only 2-3 hours   Mood - denies depression but does feel anxious at times and nervous in big crowds, does not want any meds for this but would rachelle to see a counselor. Denies HI or SI is tearful at times and has been fatigued lately. Would like referred to weight management not able to loose weight. Pt does admit to snoring    Health Maintenance reviewed with patient today. Past Medical History:   Diagnosis Date    RSV (acute bronchiolitis due to respiratory syncytial virus)        History reviewed. No pertinent surgical history. Social History     Socioeconomic History    Marital status: Single     Spouse name: Not on file    Number of children: Not on file    Years of education: Not on file    Highest education level: Not on file   Occupational History    Not on file   Tobacco Use    Smoking status: Never    Smokeless tobacco: Never   Substance and Sexual Activity    Alcohol use: No    Drug use: Never    Sexual activity: Not on file   Other Topics Concern    Not on file   Social History Narrative    Not on file     Social Determinants of Health     Financial Resource Strain: Low Risk     Difficulty of Paying Living Expenses: Not hard at all   Food Insecurity: No Food Insecurity    Worried About Running Out of Food in the Last Year: Never true    [address] in the Last Year: Never true   Transportation Needs: Unknown    Lack of Transportation (Medical): Not on file    Lack of Transportation (Non-Medical):  No   Physical Activity: Not on file   Stress: Not on file   Social Connections: Not on file   Intimate

## 2025-06-12 ENCOUNTER — APPOINTMENT (OUTPATIENT)
Dept: GENERAL RADIOLOGY | Age: 25
End: 2025-06-12
Payer: COMMERCIAL

## 2025-06-12 ENCOUNTER — HOSPITAL ENCOUNTER (EMERGENCY)
Age: 25
Discharge: HOME | End: 2025-06-12
Payer: COMMERCIAL

## 2025-06-12 VITALS
HEIGHT: 66 IN | RESPIRATION RATE: 16 BRPM | OXYGEN SATURATION: 98 % | TEMPERATURE: 97.5 F | SYSTOLIC BLOOD PRESSURE: 130 MMHG | HEART RATE: 71 BPM | BODY MASS INDEX: 45.8 KG/M2 | DIASTOLIC BLOOD PRESSURE: 80 MMHG | WEIGHT: 285 LBS

## 2025-06-12 DIAGNOSIS — S49.91XA ARM INJURY, RIGHT, INITIAL ENCOUNTER: Primary | ICD-10-CM

## 2025-06-12 PROCEDURE — 73110 X-RAY EXAM OF WRIST: CPT

## 2025-06-12 PROCEDURE — 73030 X-RAY EXAM OF SHOULDER: CPT

## 2025-06-12 PROCEDURE — 99203 OFFICE O/P NEW LOW 30 MIN: CPT

## 2025-06-12 PROCEDURE — 73060 X-RAY EXAM OF HUMERUS: CPT

## 2025-06-12 ASSESSMENT — PAIN DESCRIPTION - LOCATION: LOCATION: SHOULDER

## 2025-06-12 ASSESSMENT — PAIN - FUNCTIONAL ASSESSMENT
PAIN_FUNCTIONAL_ASSESSMENT: PREVENTS OR INTERFERES WITH MANY ACTIVE NOT PASSIVE ACTIVITIES
PAIN_FUNCTIONAL_ASSESSMENT: 0-10

## 2025-06-12 ASSESSMENT — PAIN DESCRIPTION - ORIENTATION: ORIENTATION: RIGHT

## 2025-06-12 ASSESSMENT — ENCOUNTER SYMPTOMS
VOMITING: 0
ABDOMINAL PAIN: 0
DIARRHEA: 0
NAUSEA: 0

## 2025-06-12 ASSESSMENT — PAIN DESCRIPTION - DESCRIPTORS: DESCRIPTORS: DISCOMFORT;ACHING

## 2025-06-12 ASSESSMENT — PAIN DESCRIPTION - PAIN TYPE: TYPE: ACUTE PAIN

## 2025-06-12 ASSESSMENT — PAIN SCALES - GENERAL: PAINLEVEL_OUTOF10: 7

## 2025-06-12 NOTE — ED PROVIDER NOTES
[facility] URGENT CARE  Urgent Care Encounter      CHIEF COMPLAINT       Chief Complaint   Patient presents with    Shoulder Injury     Fell roller skating, pain in right shoulder, upper arm and wrist       Nurses Notes reviewed and I agree except as noted in the HPI.  HISTORY OF PRESENT ILLNESS   Adia Hopkins is a 24 y.o. female who presents to urgent care with complaints of shoulder pain, right upper arm pain, and wrist pain of right arm. Patient reports she was skating at the skating rink today when she fell and caught herself with her right arm. Denies numbness, tingling, or loss of sensation. Reports decreased range of motion. Denies taking over the counter medication for symptoms.     REVIEW OF SYSTEMS     Review of Systems   Constitutional:  Negative for fever.   Gastrointestinal:  Negative for abdominal pain, diarrhea, nausea and vomiting.   Musculoskeletal:  Positive for myalgias. Negative for arthralgias.       PAST MEDICAL HISTORY         Diagnosis Date    RSV (acute bronchiolitis due to respiratory syncytial virus)        SURGICAL HISTORY     Patient  has no past surgical history on file.    CURRENT MEDICATIONS       Discharge Medication List as of 6/12/2025 12:01 PM        CONTINUE these medications which have NOT CHANGED    Details   ibuprofen (ADVIL;MOTRIN) 600 MG tablet Take 1 tablet by mouth 3 times daily as needed for Pain, Disp-30 tablet, R-0Normal      acetaminophen (TYLENOL) 500 MG tablet Take 1 tablet by mouth every 6 hours as needed for PainHistorical Med             ALLERGIES     Patient is has no known allergies.    FAMILY HISTORY     Patient'sfamily history includes Alcohol Abuse in her father and paternal grandfather; Alzheimer's Disease in her maternal grandmother; Arthritis in her maternal grandfather and mother; COPD in her maternal grandmother; Cancer in her maternal uncle and paternal grandmother; Diabetes in her maternal grandmother; High Blood Pressure in her father;    Electronically signed by Dr. Susan Rodrigues      XR WRIST RIGHT (MIN 3 VIEWS)   Final Result   1. No acute bony abnormality            **This report has been created using voice recognition software.  It may contain   minor errors which are inherent in voice recognition technology.**      Electronically signed by Dr. Susan Rodrigues         URGENT CARE COURSE:     Vitals:    06/12/25 1105   BP: 130/80   Pulse: 71   Resp: 16   Temp: 97.5 °F (36.4 °C)   TempSrc: Oral   SpO2: 98%   Weight: 129.3 kg (285 lb)   Height: 1.676 m (5' 6\")       Medications - No data to display  PROCEDURES:  None  FINAL IMPRESSION      1. Arm injury, right, initial encounter        DISPOSITION/PLAN   DISPOSITION Decision To Discharge 06/12/2025 12:00:21 PM   DISPOSITION CONDITION Stable         Xrays reveal no acute fractures as read by myself and confirmed by radiologist. Discussed with patient she may take tylenol and ibuprofen as needed for discomfort. Discussed with patient to follow up with [facility] for re-evaluation and reassessment of symptoms. Ice affected extremity as needed. Work note provided. Patient in agreement with this plan.     PATIENT REFERRED TO:  [facility]  [facility] [place] [address]  [phone]  Go to   [facility] CLIN[facility] 7A-4P MONDAY-THURSDAY    DISCHARGE MEDICATIONS:  Discharge Medication List as of 6/12/2025 12:01 PM        Discharge Medication List as of 6/12/2025 12:01 PM          DALTON Duque CNP, Alecksa N, APRN - CNP  06/12/25 1257

## 2025-06-12 NOTE — ED TRIAGE NOTES
Patient ambulated to room with complaint of right shoulder pain, right upper arm pain and right wrist pain. States she fell roller skating today.